# Patient Record
Sex: MALE | Race: WHITE | NOT HISPANIC OR LATINO | Employment: OTHER | URBAN - METROPOLITAN AREA
[De-identification: names, ages, dates, MRNs, and addresses within clinical notes are randomized per-mention and may not be internally consistent; named-entity substitution may affect disease eponyms.]

---

## 2017-11-20 ENCOUNTER — NEW PATIENT COMPREHENSIVE (OUTPATIENT)
Dept: URBAN - METROPOLITAN AREA CLINIC 43 | Facility: CLINIC | Age: 62
End: 2017-11-20

## 2017-11-20 DIAGNOSIS — H25.093: ICD-10-CM

## 2017-11-20 PROCEDURE — 92004 COMPRE OPH EXAM NEW PT 1/>: CPT

## 2017-11-20 PROCEDURE — 92015 DETERMINE REFRACTIVE STATE: CPT

## 2017-11-20 ASSESSMENT — TONOMETRY
OD_IOP_MMHG: 16
OS_IOP_MMHG: 16

## 2017-11-20 ASSESSMENT — VISUAL ACUITY
OD_SC: J1
OS_SC: J3
OS_SC: 20/50
OD_SC: 20/30
OD_CC: 20/30
OD_BAT: 20/40
OS_BAT: 20/50
OS_CC: 20/30-2

## 2020-08-28 ENCOUNTER — ESTABLISHED COMPREHENSIVE EXAM (OUTPATIENT)
Dept: URBAN - METROPOLITAN AREA CLINIC 43 | Facility: CLINIC | Age: 65
End: 2020-08-28

## 2020-08-28 DIAGNOSIS — H25.093: ICD-10-CM

## 2020-08-28 DIAGNOSIS — H52.13: ICD-10-CM

## 2020-08-28 DIAGNOSIS — H52.4: ICD-10-CM

## 2020-08-28 PROCEDURE — 92015 DETERMINE REFRACTIVE STATE: CPT

## 2020-08-28 PROCEDURE — 92014 COMPRE OPH EXAM EST PT 1/>: CPT

## 2020-08-28 ASSESSMENT — TONOMETRY
OS_IOP_MMHG: 14
OD_IOP_MMHG: 14

## 2020-08-28 ASSESSMENT — VISUAL ACUITY
OS_SC: J4
OS_SC: 20/60-1
OD_SC: J8
OS_CC: 20/25
OD_CC: 20/20-2
OD_SC: 20/30-1

## 2021-01-04 NOTE — PATIENT DISCUSSION
"""Edema OD. Suggest a DSEK with Dr. Lucrecia Vázquez for treatement.  Would require removal of RLF OD at ""

## 2021-03-04 NOTE — PATIENT DISCUSSION
"""S/P IOL OS: Sensar AAB00 +27.00 +Omidria.  Continue post operative instructions and drops per schedule. "" ""Continue Pred-Moxi-Nepaf left eye three times a day

## 2022-02-02 ENCOUNTER — COMPREHENSIVE EXAM (OUTPATIENT)
Dept: URBAN - METROPOLITAN AREA CLINIC 43 | Facility: CLINIC | Age: 67
End: 2022-02-02

## 2022-02-02 DIAGNOSIS — H52.4: ICD-10-CM

## 2022-02-02 DIAGNOSIS — H52.13: ICD-10-CM

## 2022-02-02 DIAGNOSIS — H25.093: ICD-10-CM

## 2022-02-02 PROCEDURE — 92015 DETERMINE REFRACTIVE STATE: CPT

## 2022-02-02 PROCEDURE — 92014 COMPRE OPH EXAM EST PT 1/>: CPT

## 2022-02-02 ASSESSMENT — VISUAL ACUITY
OS_SC: J10
OD_SC: J8
OD_SC: 20/30+2
OS_CC: 20/25-2
OD_CC: 20/30-2
OS_SC: 20/60-1

## 2022-02-02 ASSESSMENT — TONOMETRY
OD_IOP_MMHG: 11
OS_IOP_MMHG: 11

## 2023-01-23 ENCOUNTER — APPOINTMENT (RX ONLY)
Dept: URBAN - METROPOLITAN AREA CLINIC 153 | Facility: CLINIC | Age: 68
Setting detail: DERMATOLOGY
End: 2023-01-23

## 2023-01-23 PROBLEM — C44.41 BASAL CELL CARCINOMA OF SKIN OF SCALP AND NECK: Status: ACTIVE | Noted: 2023-01-23

## 2023-01-23 PROCEDURE — 99203 OFFICE O/P NEW LOW 30 MIN: CPT

## 2023-01-23 PROCEDURE — ? CONSULTATION FOR MOHS SURGERY

## 2023-01-23 NOTE — PROCEDURE: CONSULTATION FOR MOHS SURGERY
Detail Level: Detailed
Body Location Override (Optional - Billing Will Still Be Based On Selected Body Map Location If Applicable): mid frontal scalp
Size Of Lesion: 0.7
X Size Of Lesion In Cm (Optional): 0.6
Incorporate Mauc In Note: Yes

## 2023-01-23 NOTE — HPI: MOHS SURGERY CONSULTATION
Has The Cancer Been Biopsied Before?: has been previously biopsied
Body Location Override (Optional): Mid frontal scalp
When Was Your Biopsy?: 12/15/2022
Who Is Your Referring Provider?: Dr. Cassi Gann

## 2023-02-01 ENCOUNTER — APPOINTMENT (RX ONLY)
Dept: URBAN - METROPOLITAN AREA CLINIC 153 | Facility: CLINIC | Age: 68
Setting detail: DERMATOLOGY
End: 2023-02-01

## 2023-02-01 PROBLEM — C44.41 BASAL CELL CARCINOMA OF SKIN OF SCALP AND NECK: Status: ACTIVE | Noted: 2023-02-01

## 2023-02-01 PROCEDURE — 17311 MOHS 1 STAGE H/N/HF/G: CPT

## 2023-02-01 PROCEDURE — ? PRESCRIPTION

## 2023-02-01 PROCEDURE — ? MOHS SURGERY

## 2023-02-01 PROCEDURE — 17312 MOHS ADDL STAGE: CPT

## 2023-02-01 PROCEDURE — 13121 CMPLX RPR S/A/L 2.6-7.5 CM: CPT

## 2023-02-01 RX ORDER — CEPHALEXIN 500 MG/1
1 CAPSULE ORAL BID
Qty: 20 | Refills: 0 | Status: ERX | COMMUNITY
Start: 2023-02-01

## 2023-02-01 RX ORDER — MUPIROCIN 20 MG/G
1 OINTMENT TOPICAL BID
Qty: 22 | Refills: 1 | Status: ERX | COMMUNITY
Start: 2023-02-01

## 2023-02-01 RX ADMIN — MUPIROCIN 1: 20 OINTMENT TOPICAL at 00:00

## 2023-02-01 RX ADMIN — CEPHALEXIN 1: 500 CAPSULE ORAL at 00:00

## 2023-02-01 NOTE — PROCEDURE: MOHS SURGERY
Stage 1 Override Histology Text: Proliferation of atypical keratinocytes from 7-7:30.\\nTumor pattern: infiltrative

## 2023-02-01 NOTE — PROCEDURE: MOHS SURGERY
Mohs Histo Method Verbiage: Each section was then chromacoded and processed in the Mohs lab using the Mohs protocol and submitted for tissue processing

## 2023-02-02 ENCOUNTER — APPOINTMENT (RX ONLY)
Dept: URBAN - METROPOLITAN AREA CLINIC 153 | Facility: CLINIC | Age: 68
Setting detail: DERMATOLOGY
End: 2023-02-02

## 2023-02-02 DIAGNOSIS — Z48.817 ENCOUNTER FOR SURGICAL AFTERCARE FOLLOWING SURGERY ON THE SKIN AND SUBCUTANEOUS TISSUE: ICD-10-CM

## 2023-02-02 PROCEDURE — ? POST-OP WOUND CHECK

## 2023-02-02 PROCEDURE — 99024 POSTOP FOLLOW-UP VISIT: CPT

## 2023-02-02 ASSESSMENT — LOCATION DETAILED DESCRIPTION DERM: LOCATION DETAILED: MID-FRONTAL SCALP

## 2023-02-02 ASSESSMENT — LOCATION ZONE DERM: LOCATION ZONE: SCALP

## 2023-02-02 ASSESSMENT — LOCATION SIMPLE DESCRIPTION DERM: LOCATION SIMPLE: ANTERIOR SCALP

## 2023-02-02 NOTE — PROCEDURE: POST-OP WOUND CHECK
Detail Level: Detailed
Add 46983 Cpt? (Important Note: In 2017 The Use Of 45876 Is Being Tracked By Cms To Determine Future Global Period Reimbursement For Global Periods): yes
Wound Evaluated By: Katty Busby CMA
Additional Comments: Previous dressing removed and surgical site was cleaned using antibacterial soap and water. Minimal crusting present with no drainage, erythema or tenderness to palpation present. Mupirocin and telfa dressing applied. Patient is scheduled for suture removal on 2/15/23 and he is aware to contact office with issues, questions and concerns

## 2023-02-15 ENCOUNTER — APPOINTMENT (RX ONLY)
Dept: URBAN - METROPOLITAN AREA CLINIC 153 | Facility: CLINIC | Age: 68
Setting detail: DERMATOLOGY
End: 2023-02-15

## 2023-02-15 DIAGNOSIS — Z48.02 ENCOUNTER FOR REMOVAL OF SUTURES: ICD-10-CM

## 2023-02-15 PROCEDURE — 99024 POSTOP FOLLOW-UP VISIT: CPT

## 2023-02-15 PROCEDURE — ? SUTURE REMOVAL (GLOBAL PERIOD)

## 2023-02-15 ASSESSMENT — LOCATION DETAILED DESCRIPTION DERM: LOCATION DETAILED: MEDIAL FRONTAL SCALP

## 2023-02-15 ASSESSMENT — LOCATION SIMPLE DESCRIPTION DERM: LOCATION SIMPLE: FRONTAL SCALP

## 2023-02-15 ASSESSMENT — LOCATION ZONE DERM: LOCATION ZONE: SCALP

## 2023-02-15 NOTE — PROCEDURE: SUTURE REMOVAL (GLOBAL PERIOD)
Body Location Override (Optional - Billing Will Still Be Based On Selected Body Map Location If Applicable): mid from scalp
Detail Level: Detailed
Add 68725 Cpt? (Important Note: In 2017 The Use Of 64305 Is Being Tracked By Cms To Determine Future Global Period Reimbursement For Global Periods): yes

## 2023-04-13 ENCOUNTER — EMERGENCY VISIT (OUTPATIENT)
Dept: URBAN - METROPOLITAN AREA CLINIC 43 | Facility: CLINIC | Age: 68
End: 2023-04-13

## 2023-04-13 DIAGNOSIS — H25.093: ICD-10-CM

## 2023-04-13 DIAGNOSIS — H43.811: ICD-10-CM

## 2023-04-13 PROCEDURE — 92012 INTRM OPH EXAM EST PATIENT: CPT

## 2023-04-13 ASSESSMENT — VISUAL ACUITY
OS_SC: 20/60-2
OD_SC: 20/25
OS_PH: 20/25

## 2023-04-13 ASSESSMENT — TONOMETRY
OD_IOP_MMHG: 11
OS_IOP_MMHG: 12

## 2023-05-12 ENCOUNTER — FOLLOW UP (OUTPATIENT)
Dept: URBAN - METROPOLITAN AREA CLINIC 43 | Facility: CLINIC | Age: 68
End: 2023-05-12

## 2023-05-12 DIAGNOSIS — H43.811: ICD-10-CM

## 2023-05-12 DIAGNOSIS — H25.093: ICD-10-CM

## 2023-05-12 PROCEDURE — 92012 INTRM OPH EXAM EST PATIENT: CPT

## 2023-05-12 ASSESSMENT — TONOMETRY
OD_IOP_MMHG: 12
OS_IOP_MMHG: 11

## 2023-05-12 ASSESSMENT — VISUAL ACUITY
OS_SC: 20/40-2
OD_SC: 20/20

## 2023-07-05 ENCOUNTER — APPOINTMENT (RX ONLY)
Dept: URBAN - METROPOLITAN AREA CLINIC 153 | Facility: CLINIC | Age: 68
Setting detail: DERMATOLOGY
End: 2023-07-05

## 2023-07-05 PROBLEM — D04.4 CARCINOMA IN SITU OF SKIN OF SCALP AND NECK: Status: ACTIVE | Noted: 2023-07-05

## 2023-07-05 PROCEDURE — ? CONSULTATION FOR MOHS SURGERY

## 2023-07-05 PROCEDURE — 99212 OFFICE O/P EST SF 10 MIN: CPT

## 2023-07-05 NOTE — PROCEDURE: CONSULTATION FOR MOHS SURGERY
Detail Level: Detailed
Size Of Lesion: 0.6
X Size Of Lesion In Cm (Optional): 0.5
Other Plan: Efudex
Incorporate Mauc In Note: Yes

## 2023-07-05 NOTE — HPI: SKIN LESION (SQUAMOUS CELL CARCINOMA)
Is This A New Presentation, Or A Follow-Up?: Squamous Cell Carcinoma
When Was Squamous Cell Carcinoma Biopsied? (Optional): 06/15/23
Accession # (Optional): [id number]

## 2024-03-26 ENCOUNTER — COMPREHENSIVE EXAM (OUTPATIENT)
Dept: URBAN - METROPOLITAN AREA CLINIC 43 | Facility: CLINIC | Age: 69
End: 2024-03-26

## 2024-03-26 DIAGNOSIS — H35.372: ICD-10-CM

## 2024-03-26 DIAGNOSIS — H52.4: ICD-10-CM

## 2024-03-26 DIAGNOSIS — H25.093: ICD-10-CM

## 2024-03-26 DIAGNOSIS — H52.13: ICD-10-CM

## 2024-03-26 DIAGNOSIS — H43.811: ICD-10-CM

## 2024-03-26 PROCEDURE — 92015 DETERMINE REFRACTIVE STATE: CPT

## 2024-03-26 PROCEDURE — 92014 COMPRE OPH EXAM EST PT 1/>: CPT

## 2024-03-26 PROCEDURE — 92134 CPTRZ OPH DX IMG PST SGM RTA: CPT

## 2024-03-26 ASSESSMENT — VISUAL ACUITY
OD_CC: J3
OD_SC: 20/25+1
OD_CC: 20/25
OS_CC: J2
OS_SC: J4
OS_CC: 20/20-2
OD_SC: J10
OS_SC: 20/200+1

## 2024-03-26 ASSESSMENT — TONOMETRY
OD_IOP_MMHG: 12
OS_IOP_MMHG: 12

## 2025-04-25 ENCOUNTER — COMPREHENSIVE EXAM (OUTPATIENT)
Age: 70
End: 2025-04-25

## 2025-04-25 DIAGNOSIS — H35.372: ICD-10-CM

## 2025-04-25 DIAGNOSIS — H52.13: ICD-10-CM

## 2025-04-25 DIAGNOSIS — H25.093: ICD-10-CM

## 2025-04-25 DIAGNOSIS — H43.811: ICD-10-CM

## 2025-04-25 DIAGNOSIS — H52.4: ICD-10-CM

## 2025-04-25 PROCEDURE — 92014 COMPRE OPH EXAM EST PT 1/>: CPT

## 2025-04-25 PROCEDURE — 92015 DETERMINE REFRACTIVE STATE: CPT
